# Patient Record
Sex: FEMALE | Race: BLACK OR AFRICAN AMERICAN | Employment: FULL TIME | URBAN - METROPOLITAN AREA
[De-identification: names, ages, dates, MRNs, and addresses within clinical notes are randomized per-mention and may not be internally consistent; named-entity substitution may affect disease eponyms.]

---

## 2021-04-29 ENCOUNTER — HOSPITAL ENCOUNTER (OUTPATIENT)
Dept: PREADMISSION TESTING | Age: 56
Discharge: HOME | End: 2021-04-29
Payer: COMMERCIAL

## 2021-04-29 PROCEDURE — U0005 INFEC AGEN DETEC AMPLI PROBE: HCPCS

## 2021-04-30 LAB — SARS-COV-2, COV2NT: NOT DETECTED

## 2021-05-02 RX ORDER — ATROPINE SULFATE 0.1 MG/ML
0.5 INJECTION INTRAVENOUS
Status: CANCELLED | OUTPATIENT
Start: 2021-05-02 | End: 2021-05-03

## 2021-05-02 RX ORDER — DEXTROMETHORPHAN/PSEUDOEPHED 2.5-7.5/.8
1.2 DROPS ORAL
Status: CANCELLED | OUTPATIENT
Start: 2021-05-02

## 2021-05-02 RX ORDER — SODIUM CHLORIDE 0.9 % (FLUSH) 0.9 %
5-40 SYRINGE (ML) INJECTION AS NEEDED
Status: CANCELLED | OUTPATIENT
Start: 2021-05-02

## 2021-05-02 RX ORDER — DIPHENHYDRAMINE HYDROCHLORIDE 50 MG/ML
50 INJECTION, SOLUTION INTRAMUSCULAR; INTRAVENOUS ONCE
Status: CANCELLED | OUTPATIENT
Start: 2021-05-02 | End: 2021-05-02

## 2021-05-02 RX ORDER — FLUMAZENIL 0.1 MG/ML
0.2 INJECTION INTRAVENOUS
Status: CANCELLED | OUTPATIENT
Start: 2021-05-02 | End: 2021-05-02

## 2021-05-02 RX ORDER — SODIUM CHLORIDE 0.9 % (FLUSH) 0.9 %
5-40 SYRINGE (ML) INJECTION EVERY 8 HOURS
Status: CANCELLED | OUTPATIENT
Start: 2021-05-02

## 2021-05-02 RX ORDER — EPINEPHRINE 0.1 MG/ML
1 INJECTION INTRACARDIAC; INTRAVENOUS
Status: CANCELLED | OUTPATIENT
Start: 2021-05-02 | End: 2021-05-03

## 2021-05-02 RX ORDER — NALOXONE HYDROCHLORIDE 0.4 MG/ML
0.4 INJECTION, SOLUTION INTRAMUSCULAR; INTRAVENOUS; SUBCUTANEOUS
Status: CANCELLED | OUTPATIENT
Start: 2021-05-02 | End: 2021-05-02

## 2021-05-03 ENCOUNTER — HOSPITAL ENCOUNTER (OUTPATIENT)
Age: 56
Setting detail: OUTPATIENT SURGERY
Discharge: HOME | End: 2021-05-03
Attending: INTERNAL MEDICINE | Admitting: INTERNAL MEDICINE
Payer: COMMERCIAL

## 2021-05-03 VITALS
HEART RATE: 74 BPM | HEIGHT: 59 IN | SYSTOLIC BLOOD PRESSURE: 107 MMHG | RESPIRATION RATE: 16 BRPM | WEIGHT: 168 LBS | DIASTOLIC BLOOD PRESSURE: 63 MMHG | OXYGEN SATURATION: 99 % | TEMPERATURE: 97.6 F | BODY MASS INDEX: 33.87 KG/M2

## 2021-05-03 LAB — GLUCOSE BLD STRIP.AUTO-MCNC: 135 MG/DL (ref 70–110)

## 2021-05-03 PROCEDURE — G0500 MOD SEDAT ENDO SERVICE >5YRS: HCPCS | Performed by: INTERNAL MEDICINE

## 2021-05-03 PROCEDURE — 2709999900 HC NON-CHARGEABLE SUPPLY: Performed by: INTERNAL MEDICINE

## 2021-05-03 PROCEDURE — 77030039961 HC KT CUST COLON BSC -D: Performed by: INTERNAL MEDICINE

## 2021-05-03 PROCEDURE — 99153 MOD SED SAME PHYS/QHP EA: CPT | Performed by: INTERNAL MEDICINE

## 2021-05-03 PROCEDURE — 74011250636 HC RX REV CODE- 250/636: Performed by: INTERNAL MEDICINE

## 2021-05-03 PROCEDURE — 77030013991 HC SNR POLYP ENDOSC BSC -A: Performed by: INTERNAL MEDICINE

## 2021-05-03 PROCEDURE — 77030040361 HC SLV COMPR DVT MDII -B: Performed by: INTERNAL MEDICINE

## 2021-05-03 PROCEDURE — 82962 GLUCOSE BLOOD TEST: CPT

## 2021-05-03 PROCEDURE — 88305 TISSUE EXAM BY PATHOLOGIST: CPT

## 2021-05-03 PROCEDURE — 76040000007: Performed by: INTERNAL MEDICINE

## 2021-05-03 RX ORDER — MIDAZOLAM HYDROCHLORIDE 1 MG/ML
.25-5 INJECTION, SOLUTION INTRAMUSCULAR; INTRAVENOUS
Status: DISCONTINUED | OUTPATIENT
Start: 2021-05-03 | End: 2021-05-03 | Stop reason: HOSPADM

## 2021-05-03 RX ORDER — SODIUM CHLORIDE 9 MG/ML
1000 INJECTION, SOLUTION INTRAVENOUS CONTINUOUS
Status: DISCONTINUED | OUTPATIENT
Start: 2021-05-03 | End: 2021-05-03 | Stop reason: HOSPADM

## 2021-05-03 RX ORDER — METFORMIN HYDROCHLORIDE 1000 MG/1
1000 TABLET ORAL DAILY
COMMUNITY

## 2021-05-03 RX ORDER — FENTANYL CITRATE 50 UG/ML
100 INJECTION, SOLUTION INTRAMUSCULAR; INTRAVENOUS
Status: DISCONTINUED | OUTPATIENT
Start: 2021-05-03 | End: 2021-05-03 | Stop reason: HOSPADM

## 2021-05-03 RX ORDER — LISINOPRIL 10 MG/1
10 TABLET ORAL DAILY
COMMUNITY

## 2021-05-03 RX ADMIN — SODIUM CHLORIDE 1000 ML: 900 INJECTION, SOLUTION INTRAVENOUS at 08:09

## 2021-05-03 NOTE — PROCEDURES
[facility]  Colonoscopy Procedure Report  _______________________________________________________  Patient: Jassi Hollingsworth                                        Attending Physician: Deven Calvin MD    Patient ID: [id number]                                    Referring Physician: Yoana Munoz MD    Exam Date: 5/3/2021      Introduction: A  50 y.o. female patient, presents for inpatient Colonoscopy    Indications: a 50 y.o. female who presents for evaluation of rectal bleeding. Pt of Dr. Michelle Pryor, here for 5yr Recall for screening colonoscopy  Increased risk, : mother and MGM had uterine cancer in their 66's. FHx of colon cancer in Paternal Uncle (66's). Last colonoscopy was done on 3/7/2016 by my self @[facility]. 2016 Findings: Mild tortuosity of sigmoid colon found, no polyps, normal mucosa, 5yr Recall with high fiber diet, and daily Miralax. She is chronically constipated  BMI 33.7:  ,she used to have one bm every 5 days but now every 2 days with high fiber diet also Metformin is helping. She had 2 C section, hysterectomy and cholecystectomy. Consent: The benefits, risks, and alternatives to the procedure were discussed and informed consent was obtained from the patient. Preparation: EKG, pulse, pulse oximetry and blood pressure were monitored throughout the procedure. ASA Classification: Class II- . The heart is an S1-S2 and regular heart rate and rhythm. Lungs are clear to auscultation and percussion. Abdomen is soft, nondistended, and nontender. Mental Status: awake, alert, and oriented to person, place, and time    Medications:  · Fentanyl 100 mcg IV before procedure. · Versed 5 mg IV throughout the procedure. Rectal Exam: Normal Rectal Exam. No Blood. Pathology Specimens:  1    Procedure: The colonoscope was passed with ease through the anus under direct visualization and advanced to the cecum and 5 cm inside the terminal ileum. Retroflexion in the ascending colon.  The scope was withdrawn and the mucosa was carefully examined. The quality of the preparation was very good. The views were excellent. The patient's toleration of the procedure was excellent. The exam was done twice to the cecum. Total time is 27 minutes and withdrawal time is 17 minutes. Findings:    Rectum:   Small internal hemorrhoids. Sigmoid:    Slightly tortuous sigmoid colon. Descending Colon:   Normal   Transverse Colon:   4 mm sessile polyp in the distal transverse colon, cold snared. Ascending Colon:   Normal   Cecum:   Normal   Terminal Ileum:   Normal      Unplanned Events: There were no unplanned events. Estimated Blood Loss: None  Impressions: Small internal hemorrhoids. Slightly tortuous sigmoid colon. 4 mm sessile polyp in the distal transverse colon, cold snared. Normal Mucosa. No blood, diverticula or AVM found. Complications: None; patient tolerated the procedure well. Recommendations:  · Discharge home when standard parameters are met. · Resume a high fiber diet. · Resume own medications. Avoid all NSAID's for 3 days. · Colonoscopy recommendation in 7 years. · Beside the high fiber diet, do not hesitate to take Miralax and/ or Colace 100 mg on regular basis to treat the constipation.     Procedure Codes:    · Nancy Baudilio [[id number]]    Endoscope Information:  Model Number(s)    [id number]   Assistant: None  Signed By: Leona Aguilar MD Date: 5/3/2021

## 2021-05-03 NOTE — DISCHARGE INSTRUCTIONS
Rakel Freeman  [id number]  1965    COLON DISCHARGE INSTRUCTIONS    Discomfort:  Redness at IV site- apply warm compress to area; if redness or soreness persist- contact your physician  There may be a slight amount of blood passed from the rectum  Gaseous discomfort- walking, belching will help relieve any discomfort  You may not operate a vehicle til the next day. You may not engage in an occupation involving machinery or appliances til the next day. You may not drink alcoholic beverages til the next day. DIET:   High fiber diet. ACTIVITY:  You may not  resume your normal daily activities til the next day. it is recommended that you spend the remainder of the day resting -  avoid any strenuous activity. CALL M.D.  IF ANY SIGN OF:   Increasing pain, nausea, vomiting  Abdominal distension (swelling)  New increased bleeding (oral or rectal)  Fever (chills)  Pain in chest area  Bloody discharge from nose or mouth  Shortness of breath    You may not  take any Advil, Aspirin, Ibuprofen, Motrin, Aleve, or Goodys for 3 days, ONLY  Tylenol as needed for pain. Post procedure diagnosis:  POLYPS; HEMORRHOIDS; TORTUOUS COLON    Follow-up Instructions: Your follow up colonoscopy will be in 7 years. We will notify you the results of your biopsy by letter within 2 weeks. Nuria Livingston MD  May 3, 2021       DISCHARGE SUMMARY from Nurse    PATIENT INSTRUCTIONS:    After general anesthesia or intravenous sedation, for 24 hours or while taking prescription Narcotics:  · Limit your activities  · Do not drive and operate hazardous machinery  · Do not make important personal or business decisions  · Do  not drink alcoholic beverages  · If you have not urinated within 8 hours after discharge, please contact your surgeon on call.     Report the following to your surgeon:  · Excessive pain, swelling, redness or odor of or around the surgical area  · Temperature over 100.5  · Nausea and vomiting lasting longer than 4 hours or if unable to take medications  · Any signs of decreased circulation or nerve impairment to extremity: change in color, persistent  numbness, tingling, coldness or increase pain  · Any questions    What to do at Home:  Recommended activity: AS ABOVE,     If you experience any of the following symptoms AS ABOVE, please follow up with DR. RODRIGUEZ. *  Please give a list of your current medications to your Primary Care Provider. *  Please update this list whenever your medications are discontinued, doses are      changed, or new medications (including over-the-counter products) are added. *  Please carry medication information at all times in case of emergency situations. These are general instructions for a healthy lifestyle:    No smoking/ No tobacco products/ Avoid exposure to second hand smoke  Surgeon General's Warning:  Quitting smoking now greatly reduces serious risk to your health. Obesity, smoking, and sedentary lifestyle greatly increases your risk for illness    A healthy diet, regular physical exercise & weight monitoring are important for maintaining a healthy lifestyle    You may be retaining fluid if you have a history of heart failure or if you experience any of the following symptoms:  Weight gain of 3 pounds or more overnight or 5 pounds in a week, increased swelling in our hands or feet or shortness of breath while lying flat in bed. Please call your doctor as soon as you notice any of these symptoms; do not wait until your next office visit. The discharge information has been reviewed with the patient. The patient verbalized understanding. Discharge medications reviewed with the patient and appropriate educational materials and side effects teaching were provided.   ___________________________________________________________________________________________________________________________________  Patient armband removed and shredded

## 2021-05-03 NOTE — H&P
Surgery History and Physcial    Subjective:      Lucien Palacios is a 50 y.o. female who presents for evaluation of abdominal pain. Pt of Dr. Kasi Luna, here for 5yr Recall for screening colonoscopy with FHx of colon cancer in Paternal Uncle (66's). Last colonoscopy was done on 3/7/2016 by Dr. Anita Junior @[facility]. 2016 Findings: Mild tortuosity of sigmoid colon found, no polyps, normal mucosa, 5yr Recall with high fiber diet, and daily Miralax. Average risk, no FHx of colon cancer. Asymptomatic of GI complaints. BMI:  , BM:  /day mother and MGM had uterine cancer in their 66's. She is chronically constipated. Patient Active Problem List    Diagnosis Date Noted    Cholecystitis 01/10/2013     Past Medical History:   Diagnosis Date    Diabetes ([address]     Fibroids     GERD (gastroesophageal reflux disease)     rare, food specific.  denies current symptoms    H/O: hysterectomy     Morbid obesity ([address]     Nausea & vomiting     Other ill-defined conditions(799.89)     borderline hypertension    Other ill-defined conditions(799.89)     occasional dizziness    Other ill-defined conditions(799.89)     pain    Other ill-defined conditions(799.89)     uterine fibroids    S/P cholecystectomy       Past Surgical History:   Procedure Laterality Date   [facility]  [id number]     section.  HX HYSTERECTOMY      TN BREAST SURGERY PROCEDURE UNLISTED      rt breast bx. Social History     Tobacco Use    Smoking status: Former Smoker    Smokeless tobacco: Never Used   Substance Use Topics    Alcohol use:  Yes     Alcohol/week: 0.0 standard drinks     Types: 1 - 2 Glasses of wine per week     Comment: 1 every 2 weeks      Family History   Problem Relation Age of Onset    Malignant Hyperthermia Neg Hx     Pseudocholinesterase Deficiency Neg Hx     Delayed Awakening Neg Hx     Post-op Nausea/Vomiting Neg Hx     Post-op Cognitive Dysfunction Neg Hx     Emergence Delirium Neg Hx       Prior to Admission medications    Medication Sig Start Date End Date Taking? Authorizing Provider   metFORMIN (GLUCOPHAGE) 1,000 mg tablet Take 1,000 mg by mouth daily. Yes Provider, Historical   lisinopriL (PRINIVIL, ZESTRIL) 10 mg tablet Take 10 mg by mouth daily. Yes Provider, Historical   ibuprofen (MOTRIN) 800 mg tablet Take 1 Tab by mouth every eight (8) hours as needed for Pain. 7/15/13  Yes Madi Petty MD   cyanocobalamin 1,000 mcg tablet Take 1,000 mcg by mouth daily. Provider, Historical   Cholecalciferol, Vitamin D3, 3,000 unit tab Take  by mouth. Provider, Historical   omega-3 fatty acids-vitamin e 1,000 mg cap Take 1 Cap by mouth. Provider, Historical   HYDROcodone-acetaminophen (VICODIN) 5-500 mg per tablet Take 1 Tab by mouth every four (4) hours as needed for Pain. 7/15/13   Madi Petty MD   promethazine (PHENERGAN) 25 mg tablet Take 1 Tab by mouth every six (6) hours as needed for Nausea. 7/15/13   Madi Petty MD   multivitamin (ONE A DAY) tablet Take 1 Tab by mouth daily. Provider, Historical   HYDROcodone-acetaminophen (NORCO) 5-325 mg per tablet Take 1 Tab by mouth every four (4) hours as needed for Pain. 1/6/13   Jignesh Billings MD     Allergies   Allergen Reactions    Citric Acid-D Gluconic Acid Hives    Iodinated Contrast Media Rash         Review of Systems     Objective:     Visit Vitals  BP (!) 149/84   Pulse 74   Temp 97.8 °F (36.6 °C)   Resp (!) 0   Ht 4' 11\" (1.499 m)   Wt 76.2 kg (168 lb)   LMP 06/15/2013   SpO2 100%   BMI 33.93 kg/m²       Physical Exam    Imaging:  images and reports reviewed    Lab Review:    Recent Results (from the past 24 hour(s))   GLUCOSE, POC    Collection Time: 05/03/21  7:41 AM   Result Value Ref Range    Glucose (POC) 135 (H) 70 - 110 mg/dL         Assessment:   a 50 y.o. female who presents for evaluation of abdominal pain.  Pt of Dr. Unique Grimes, here for 5yr Recall for screening colonoscopy with FHx of colon cancer in Paternal Uncle (66's). Last colonoscopy was done on 3/7/2016 by Dr. Christian Ramirez @[facility]. 2016 Findings: Mild tortuosity of sigmoid colon found, no polyps, normal mucosa, 5yr Recall with high fiber diet, and daily Miralax. Average risk, no FHx of colon cancer. Asymptomatic of GI complaints. BMI:  , BM:  /day mother and MGM had uterine cancer in their 66's. She is chronically constipated. Plan:     1. I recommend proceeding with colonoscopy    2. Discussed aspects of surgical intervention, methods, risks including by not limited to infection, bleeding, hematoma, and perforation of the intestines or solid organs, and the risks of general anesthetic. The patient understands the risks; any and all questions were answered to the patient's satisfaction.

## 2025-05-12 NOTE — H&P
Assessment/Plan  # Detail Type Description    1. Assessment Chronic idiopathic constipation (K59.04).    Impression Life-long CIC (Markedly worsened in the last 6 months)  Was able to have one BM/day on previous dosage of Metformin.  Now has been on Ozempic for 2 months, and Metformin - Has one BM every other day now.    Patient Plan -Pt declined Rx for constipation at this time.  ___________________________________________  *Constipation Instructions provided to pt:  -Take Miralax (1/2 to 1 capful in AM, daily) to increase water content in stools. Try to drink more water, walk as often as possible, and eat fresh fruits (i.e. fresh kiwi fruit) and vegetables. Try not to neglect eating cooked vegetables, and smoothies as well (Add kiwi fruit, flax seeds/oil, zeyad seeds). Instructed patient to retrain body to attempt BM every morning, do not hold it.  Instead try to have a bowel movement when sensation is present.  -Recommend MagO7 supplement as needed for constipation.    -Hold fiber supplements while constipated, as this will only make constipation worse. Focus on dietary fiber sources, instead of supplements for chronic constipation. If no chronic constipation, may slowly add supplements back in only after bowels are consistently moving.         2. Assessment Change in bowel habit (R19.4).    Impression *Clinical Change:  Change in bowel habits - worsened constipation x6 months (prior to Ozempic), with BRBPR w/BM in bowl and wipe-type. Associated symptoms of bloating (constant), fullness, early satiety, and fatigue.  No pain associated..    Patient Plan Will plan for sooner colonoscopy, to evaluate for clinical change.  If negative, and upper GI complaints persist, may pursue EGD next    *C-scope Plan:  Colonoscopy ordered with Dr. Dennis with Golytely/Gavilyte bowel prep, and Miralax and stool softeners starting 3 days before prep.  -Patient is advised that they should take their aspirin (if prescribed) up

## 2025-05-14 ENCOUNTER — HOSPITAL ENCOUNTER (OUTPATIENT)
Facility: HOSPITAL | Age: 60
Setting detail: OUTPATIENT SURGERY
Discharge: HOME | End: 2025-05-14
Attending: INTERNAL MEDICINE | Admitting: INTERNAL MEDICINE
Payer: COMMERCIAL

## 2025-05-14 VITALS
TEMPERATURE: 98 F | OXYGEN SATURATION: 99 % | HEART RATE: 79 BPM | RESPIRATION RATE: 18 BRPM | SYSTOLIC BLOOD PRESSURE: 125 MMHG | DIASTOLIC BLOOD PRESSURE: 66 MMHG

## 2025-05-14 LAB — GLUCOSE BLD STRIP.AUTO-MCNC: 89 MG/DL (ref 70–110)

## 2025-05-14 PROCEDURE — 2709999900 HC NON-CHARGEABLE SUPPLY: Performed by: INTERNAL MEDICINE

## 2025-05-14 PROCEDURE — 3600007512: Performed by: INTERNAL MEDICINE

## 2025-05-14 PROCEDURE — 99152 MOD SED SAME PHYS/QHP 5/>YRS: CPT | Performed by: INTERNAL MEDICINE

## 2025-05-14 PROCEDURE — 3600007502: Performed by: INTERNAL MEDICINE

## 2025-05-14 PROCEDURE — 2580000003 HC RX 258: Performed by: INTERNAL MEDICINE

## 2025-05-14 PROCEDURE — 82962 GLUCOSE BLOOD TEST: CPT

## 2025-05-14 PROCEDURE — 7100000010 HC PHASE II RECOVERY - FIRST 15 MIN: Performed by: INTERNAL MEDICINE

## 2025-05-14 PROCEDURE — 99153 MOD SED SAME PHYS/QHP EA: CPT | Performed by: INTERNAL MEDICINE

## 2025-05-14 PROCEDURE — 88305 TISSUE EXAM BY PATHOLOGIST: CPT

## 2025-05-14 PROCEDURE — 6360000002 HC RX W HCPCS: Performed by: INTERNAL MEDICINE

## 2025-05-14 PROCEDURE — 7100000011 HC PHASE II RECOVERY - ADDTL 15 MIN: Performed by: INTERNAL MEDICINE

## 2025-05-14 RX ORDER — SODIUM CHLORIDE 9 MG/ML
INJECTION, SOLUTION INTRAVENOUS CONTINUOUS
Status: DISCONTINUED | OUTPATIENT
Start: 2025-05-14 | End: 2025-05-14 | Stop reason: HOSPADM

## 2025-05-14 RX ORDER — MIDAZOLAM HYDROCHLORIDE 1 MG/ML
INJECTION, SOLUTION INTRAMUSCULAR; INTRAVENOUS PRN
Status: DISCONTINUED | OUTPATIENT
Start: 2025-05-14 | End: 2025-05-14 | Stop reason: ALTCHOICE

## 2025-05-14 RX ORDER — FENTANYL CITRATE 50 UG/ML
INJECTION, SOLUTION INTRAMUSCULAR; INTRAVENOUS PRN
Status: DISCONTINUED | OUTPATIENT
Start: 2025-05-14 | End: 2025-05-14 | Stop reason: ALTCHOICE

## 2025-05-14 RX ORDER — MULTIVITAMIN
1 TABLET ORAL DAILY
COMMUNITY

## 2025-05-14 RX ORDER — SEMAGLUTIDE 1.34 MG/ML
INJECTION, SOLUTION SUBCUTANEOUS
COMMUNITY
Start: 2025-04-21

## 2025-05-14 RX ORDER — METFORMIN HYDROCHLORIDE 500 MG/1
500 TABLET, EXTENDED RELEASE ORAL
COMMUNITY

## 2025-05-14 RX ADMIN — SODIUM CHLORIDE: 0.9 INJECTION, SOLUTION INTRAVENOUS at 13:21

## 2025-05-14 ASSESSMENT — PAIN - FUNCTIONAL ASSESSMENT
PAIN_FUNCTIONAL_ASSESSMENT: NONE - DENIES PAIN
PAIN_FUNCTIONAL_ASSESSMENT: 0-10
PAIN_FUNCTIONAL_ASSESSMENT: 0-10
PAIN_FUNCTIONAL_ASSESSMENT: NONE - DENIES PAIN

## 2025-05-14 NOTE — PRE SEDATION
Sedation Pre-Procedure Note    Patient Name: Trudy uLna   YOB: 1965  Room/Bed: ENDO/PL  Medical Record Number: [id number]  Date: 5/14/2025   Time: 2:34 PM       Indication:  change in bowel habits    Consent: I have discussed with the patient and/or the patient representative the indication, alternatives, and the possible risks and/or complications of the planned procedure and the anesthesia methods. The patient and/or patient representative appear to understand and agree to proceed.    Vital Signs:   Vitals:    05/14/25 1430   BP: (!) 153/79   Pulse: 82   Resp: 10   Temp:    SpO2: 100%       Past Medical History:   has a past medical history of Diabetes (HCC), Fibroids, GERD (gastroesophageal reflux disease), H/O: hysterectomy, Morbid obesity (HCC), Nausea & vomiting, Other ill-defined conditions(799.89), Other ill-defined conditions(799.89), Other ill-defined conditions(799.89), Other ill-defined conditions(799.89), and S/P cholecystectomy.    Past Surgical History:   has a past surgical history that includes Breast surgery; Hysterectomy; Colonoscopy (N/A, 5/3/2021); Cholecystectomy; and gyn (1987,2001).    Medications:   Scheduled Meds:   Continuous Infusions:    sodium chloride 100 mL/hr at 05/14/25 1321     PRN Meds:   Home Meds:   Prior to Admission medications    Medication Sig Start Date End Date Taking? Authorizing Provider   OZEMPIC, 1 MG/DOSE, 4 MG/3ML SOPN sc injection INJECT 1 MG BENEATH SKIN EVERY WEEK ON THE SAME DAY OF EACH WEEK 4/21/25  Yes Tenzin Ryan MD   ibuprofen (ADVIL;MOTRIN) 800 MG tablet Take 1 tablet by mouth every 8 hours as needed 7/15/13  Yes Automatic Reconciliation, Ar   metFORMIN (GLUCOPHAGE-XR) 500 MG extended release tablet Take 1 tablet by mouth daily (with breakfast)    Tenzin Ryan MD   Multiple Vitamin (DAILY VITES) TABS Take 1 tablet by mouth daily    Tenzin Ryan MD   Cholecalciferol 75 MCG (3000 UT) TABS Take by mouth

## 2025-05-14 NOTE — DISCHARGE INSTRUCTIONS
Trudy Luna  [id number]  1965    COLON DISCHARGE INSTRUCTIONS    Discomfort:  Redness at IV site- apply warm compress to area; if redness or soreness persist- contact your physician  There may be a slight amount of blood passed from the rectum  Gaseous discomfort- walking, belching will help relieve any discomfort  You may not operate a vehicle til the next day.  You may not engage in an occupation involving machinery or appliances til the next day.  You may not drink alcoholic beverages til the next day.    DIET:   High fiber diet.     ACTIVITY:  You may not  resume your normal daily activities til the next day. it is recommended that you spend the remainder of the day resting -  avoid any strenuous activity.    CALL M.D.  IF ANY SIGN OF:   Increasing pain, nausea, vomiting  Abdominal distension (swelling)  New increased bleeding (oral or rectal)  Fever (chills)  Pain in chest area  Bloody discharge from nose or mouth  Shortness of breath    You may not  take any Advil, Aspirin, Ibuprofen, Motrin, Aleve, or Goody’s for 3 days, ONLY  Tylenol as needed for pain.    Post procedure diagnosis:  Tortious sigmoid. Mild sigmoid diverticulosis. 5 mm sessile polyp in the mid sigmoid at 22 cm, cold snared. 4 mm sessile polyp in the hepatic flexure, cold snared.     Follow-up Instructions:   Your follow up colonoscopy will be in 10 years.    We will notify you the results of your biopsy by letter within 2 weeks.    Qian Dennis MD  May 14, 2025      DISCHARGE SUMMARY from Nurse    PATIENT INSTRUCTIONS:    After general anesthesia or intravenous sedation, for 24 hours or while taking prescription Narcotics:  Limit your activities  Do not drive and operate hazardous machinery  Do not make important personal or business decisions  Do  not drink alcoholic beverages  If you have not urinated within 8 hours after discharge, please contact your surgeon on call.    Report the following to your

## 2025-05-14 NOTE — PROCEDURES
[facility]  Colonoscopy Procedure Report  _______________________________________________________  Patient: Trudy Luna                                        Attending Physician: TIMBO Dennis MD    Patient ID: [id number]                                    Referring Physician: Imelda Cutler MD    Exam Date: 5/14/2025     Introduction: A  59 y.o. female patient, presents for inpatient Colonoscopy    Indications: Life-long CIC (Markedly worsened in the last 6 months) with bloating  Was able to have one BM/day on previous dosage of Metformin.  Now has been on Ozempic for 2 months, and Metformin - Has one BM every other day now.  Internal hemorrhoids seen on last c-scope in 2021. Pt denies straining defecatory effort.  DM type 2  x 6 years. She had 2  C section, hysterectomy and cholecystectomy. FHx of colon cancer in Paternal Uncle (70's).  FHx of Uterine cancer: Mother and MGM in their 70's.  colonoscopy was done on 3/7/2016 by my self @[facility]. 2016 Findings: Mild tortuosity of sigmoid colon found, no polyps, normal mucosa, 5yr Recall. Last colonoscopy also by myself 5/3/ 2021 Small internal hemorrhoids. Slightly tortuous sigmoid colon. 4 mm sessile polyp in the distal transverse colon, cold snared. Recommendation repat in 7 years  Consent: The benefits, risks, and alternatives to the procedure were discussed and informed consent was obtained from the patient.    Preparation: EKG, pulse, pulse oximetry and blood pressure were monitored throughout the procedure. ASA Classification: Class II- . The heart is an S1-S2 and regular heart rate and rhythm. Lungs are clear to auscultation and percussion. Abdomen is soft, nondistended, and nontender. Mental Status: awake, alert, and oriented to person, place, and time    Medications:  Fentanyl 100 mcg IV before procedure.  Versed 4 mg IV throughout the procedure.    Rectal Exam: Normal Rectal Exam. No Blood.     Pathology Specimens:   2    Procedure:  The colonoscope was passed with difficulty through the anus under direct visualization and advanced to the cecum and 3 cm inside the terminal ileum. Retroflexion is made in the ascending colon. The patient required positioning on the back to aid in the passage of the scope. The scope was withdrawn and the mucosa was carefully examined. The quality of the preparation was excellent. The views were excellent. The patient's toleration of the procedure was excellent. The exam was done twice to the cecum. Total time is 28 minutes and withdrawal time is 19 minutes.    Findings:    Rectum:   Small internal hemorrhoids.  Sigmoid:   Tortious sigmoid. Mild sigmoid diverticulosis. 5 mm sessile polyp in the mid sigmoid at 22 cm, cold snared.  Descending Colon:   Normal   Transverse Colon:   4 mm sessile polyp in the hepatic flexure, cold snared.   Ascending Colon:   Normal  Cecum:   Normal  Terminal Ileum:   Normal.       Unplanned Events: There were no unplanned events.    Estimated Blood Loss: Negligible  IMPLANTS: * No surgical log found *  Impressions: Small internal hemorrhoids. Tortious sigmoid. Mild sigmoid diverticulosis. 5 mm sessile polyp in the mid sigmoid at 22 cm, cold snared. 4 mm sessile polyp in the hepatic flexure, cold snared. . Normal Mucosa. No blood, polyps or AVM found.    Complications: None; patient tolerated the procedure well.    Recommendations:  Discharge home when standard parameters are met.  Resume a high fiber diet.  Resume own medications. Avoid all NSAID's for  Colonoscopy recommendation in 10 years.  Take Miralax and/ or Colace 100 mg on regular basis if constipated    Procedure Codes:    COLONOSCOPY,REMV ANGELA SALAS [[id number]]     Endoscope Information:  Model Number(s)    [id number]   Assistant: None  Signed By: TIMBO Dennis MD Date: 5/14/2025

## (undated) DEVICE — Device

## (undated) DEVICE — SPONGE GZ W4XL4IN COT 12 PLY TYP VII WVN C FLD DSGN

## (undated) DEVICE — SYR 3ML LL TIP 1/10ML GRAD --

## (undated) DEVICE — CATH SUC CTRL PRT TRIFLO 14FR --

## (undated) DEVICE — KENDALL RADIOLUCENT FOAM MONITORING ELECTRODE RECTANGULAR SHAPE: Brand: KENDALL

## (undated) DEVICE — NDL PRT INJ NSAF BLNT 18GX1.5 --

## (undated) DEVICE — TUBING, SUCTION, 1/4" X 12', STRAIGHT: Brand: MEDLINE

## (undated) DEVICE — TOURNIQUET PHLEB W1XL18IN BLU FLAT RL AND BND REUSE FOR IV

## (undated) DEVICE — SYR 5ML 1/5 GRAD LL NSAF LF --

## (undated) DEVICE — SPONGE GZ W4XL4IN RAYON POLY 4 PLY NONWOVEN FASTER WICKING

## (undated) DEVICE — TRAP SPEC COLL POLYP POLYSTYR --

## (undated) DEVICE — TRNQT TEXT 1X18IN BLU LF DISP -- CONVERT TO ITEM 362165

## (undated) DEVICE — SYRINGE 50ML E/T

## (undated) DEVICE — CATH IV SAFE STR 22GX1IN BLU -- PROTECTIV PLUS

## (undated) DEVICE — SYRINGE MED 5ML STD CLR PLAS LUERLOCK TIP N CTRL DISP

## (undated) DEVICE — SET ADMIN 16ML TBNG L100IN 2 Y INJ SITE IV PIGGY BK DISP

## (undated) DEVICE — WRISTBAND ID AD W2.5XL9.5CM RED VYN ADH CLSR UNI-PRINT 655-16-PDJ

## (undated) DEVICE — SOLUTION IV 500ML 0.9% SOD CHL FLX CONT

## (undated) DEVICE — Device: Brand: SINGLE USE ELECTROSURGICAL SNARE SD-400

## (undated) DEVICE — WRISTBAND ID AD W2.5XL9.5CM RED VYN ADH CLSR UNI-PRINT

## (undated) DEVICE — GARMENT,MEDLINE,DVT,INT,CALF,MED, GEN2: Brand: MEDLINE

## (undated) DEVICE — CANNULA CUSH AD W/ 14FT TBG

## (undated) DEVICE — NDL FLTR TIP 5 MIC 18GX1.5IN --

## (undated) DEVICE — SINGLE PORT MANIFOLD: Brand: NEPTUNE 2

## (undated) DEVICE — MAJ-1414 SINGLE USE ADPATER BIOPSY VALV: Brand: SINGLE USE ADAPTOR BIOPSY VALVE

## (undated) DEVICE — SYRINGE MEDICAL 3ML CLEAR PLASTIC STANDARD NON CONTROL LUERLOCK TIP DISPOSABLE

## (undated) DEVICE — SOLUTION IV 1000 ML 0.9 NACL INJ USP EXCEL PLAS CONTAINER

## (undated) DEVICE — SNARE POLYP SM W13MMXL240CM SHTH DIA2.4MM OVL FLX DISP

## (undated) DEVICE — BLUNTFILL: Brand: MONOJECT

## (undated) DEVICE — CATHETER IV 22GA L1IN BLU POLYUR STR HUB RADPQ PROTCT +